# Patient Record
Sex: FEMALE | URBAN - METROPOLITAN AREA
[De-identification: names, ages, dates, MRNs, and addresses within clinical notes are randomized per-mention and may not be internally consistent; named-entity substitution may affect disease eponyms.]

---

## 2022-10-27 ENCOUNTER — OFFICE VISIT (OUTPATIENT)
Dept: URBAN - METROPOLITAN AREA CLINIC 35 | Facility: CLINIC | Age: 77
End: 2022-10-27
Payer: MEDICARE

## 2022-10-27 DIAGNOSIS — H35.3112 NONEXUDATIVE AGE-RELATED MACULAR DEGENERATION, RIGHT EYE, INTERMEDIATE DRY STAGE: Primary | ICD-10-CM

## 2022-10-27 DIAGNOSIS — H35.3124 NONEXUDATIVE AGE-RELATED MACULAR DEGENERATION OF LEFT EYE, ADVANCED ATROPHIC WITH SUBFOVEAL INVOLVEMENT: ICD-10-CM

## 2022-10-27 PROCEDURE — 99204 OFFICE O/P NEW MOD 45 MIN: CPT | Performed by: OPHTHALMOLOGY

## 2022-10-27 PROCEDURE — 92134 CPTRZ OPH DX IMG PST SGM RTA: CPT | Performed by: OPHTHALMOLOGY

## 2022-10-27 ASSESSMENT — INTRAOCULAR PRESSURE
OD: 11
OS: 10

## 2022-10-27 NOTE — IMPRESSION/PLAN
Impression: Nonexudative age-related macular degeneration, right eye, intermediate dry stage: H35.3112. Plan: The patient has dry age-related macular degeneration (AMD). OCT shows no IRF/SRF OU and atrophic retina OS. We discussed dry vs wet AMD, and I explained to the patient that currently there is no retinal treatment for dry AMD at this time. I recommend the patient take the AREDS formula anti-oxidant vitamins and to continue weekly self-monitoring for progression with the amsler grid. The patient understands that smoking is the most significant modifiable risk factor for the development of advanced AMD. If there are any changes on the amsler grid, distortion or decreased vision, the patient was encouraged to contact our office immediately. Otherwise, she will f/u with your excellent care. thanks [employer] [facility] PRN Prior notes from other provider(s) have been reviewed in conjunction with today's visit.

## 2022-10-27 NOTE — IMPRESSION/PLAN
Impression: Nonexudative age-related macular degeneration of left eye, advanced atrophic with subfoveal involvement: H35.3124. Plan: See Above.